# Patient Record
Sex: MALE | Race: WHITE | NOT HISPANIC OR LATINO | Employment: OTHER | URBAN - NONMETROPOLITAN AREA
[De-identification: names, ages, dates, MRNs, and addresses within clinical notes are randomized per-mention and may not be internally consistent; named-entity substitution may affect disease eponyms.]

---

## 2023-09-21 PROBLEM — I71.40 ABDOMINAL AORTIC ANEURYSM (AAA) (CMS-HCC): Status: ACTIVE | Noted: 2023-09-21

## 2023-09-21 PROBLEM — C45.9: Status: ACTIVE | Noted: 2023-09-21

## 2023-09-21 PROBLEM — C34.90 LUNG CANCER (MULTI): Status: ACTIVE | Noted: 2023-09-21

## 2023-09-21 PROBLEM — R63.6 UNDERWEIGHT: Status: ACTIVE | Noted: 2023-09-21

## 2023-09-21 PROBLEM — R91.8 LUNG MASS: Status: ACTIVE | Noted: 2023-09-21

## 2023-09-21 PROBLEM — I10 HYPERTENSION: Status: ACTIVE | Noted: 2023-09-21

## 2023-09-21 PROBLEM — I48.0 PAF (PAROXYSMAL ATRIAL FIBRILLATION) (MULTI): Status: ACTIVE | Noted: 2023-09-21

## 2023-09-21 PROBLEM — Z79.899 HIGH RISK MEDICATION USE: Status: ACTIVE | Noted: 2023-09-21

## 2023-09-21 PROBLEM — F17.200 CURRENT EVERY DAY SMOKER: Status: ACTIVE | Noted: 2023-09-21

## 2023-09-21 PROBLEM — K82.8 PORCELAIN GALLBLADDER: Status: ACTIVE | Noted: 2023-09-21

## 2023-09-21 RX ORDER — MIRTAZAPINE 15 MG/1
15 TABLET, FILM COATED ORAL NIGHTLY
COMMUNITY
End: 2024-06-10 | Stop reason: ALTCHOICE

## 2023-09-21 RX ORDER — GABAPENTIN 300 MG/1
1 CAPSULE ORAL DAILY
COMMUNITY

## 2023-09-21 RX ORDER — AMOXICILLIN 250 MG
2 CAPSULE ORAL NIGHTLY PRN
COMMUNITY
End: 2023-10-20

## 2023-09-21 RX ORDER — GUAIFENESIN 1200 MG/1
1200 TABLET, EXTENDED RELEASE ORAL EVERY 12 HOURS
COMMUNITY
Start: 2019-11-27 | End: 2023-10-20

## 2023-09-21 RX ORDER — ACETAMINOPHEN 500 MG
1 TABLET ORAL DAILY
COMMUNITY
End: 2023-10-20

## 2023-09-21 RX ORDER — ACETAMINOPHEN 500 MG/1
500 CAPSULE, LIQUID FILLED ORAL EVERY 6 HOURS PRN
COMMUNITY
End: 2023-10-20

## 2023-09-21 RX ORDER — MORPHINE SULFATE 15 MG/1
15 TABLET ORAL EVERY 12 HOURS
COMMUNITY
End: 2023-10-20

## 2023-09-21 RX ORDER — AMIODARONE HYDROCHLORIDE 200 MG/1
1 TABLET ORAL DAILY
COMMUNITY
End: 2023-10-20

## 2023-09-21 RX ORDER — POTASSIUM CHLORIDE 750 MG/1
2 CAPSULE, EXTENDED RELEASE ORAL DAILY
COMMUNITY
End: 2023-10-20

## 2023-09-21 RX ORDER — POLYETHYLENE GLYCOL 3350 17 G/17G
POWDER, FOR SOLUTION ORAL
COMMUNITY
End: 2023-10-20

## 2023-09-21 RX ORDER — POTASSIUM CHLORIDE 20 MEQ/1
1 TABLET, EXTENDED RELEASE ORAL DAILY
COMMUNITY
End: 2023-10-20

## 2023-09-21 RX ORDER — HYDROCHLOROTHIAZIDE 25 MG/1
1 TABLET ORAL DAILY
COMMUNITY
End: 2023-10-20

## 2023-09-21 RX ORDER — METOPROLOL TARTRATE 50 MG/1
0.5 TABLET ORAL 2 TIMES DAILY
COMMUNITY
End: 2024-06-10 | Stop reason: SDUPTHER

## 2023-09-21 RX ORDER — MORPHINE SULFATE 15 MG/1
1 TABLET, FILM COATED, EXTENDED RELEASE ORAL EVERY 8 HOURS
COMMUNITY
Start: 2019-11-27 | End: 2023-10-20

## 2023-09-21 RX ORDER — NAPROXEN 500 MG/1
500 TABLET ORAL 2 TIMES DAILY
COMMUNITY
End: 2023-10-20

## 2023-09-21 RX ORDER — DULOXETIN HYDROCHLORIDE 60 MG/1
1 CAPSULE, DELAYED RELEASE ORAL DAILY
COMMUNITY
End: 2024-06-10 | Stop reason: ALTCHOICE

## 2023-09-21 RX ORDER — LISINOPRIL 10 MG/1
1 TABLET ORAL DAILY
COMMUNITY
End: 2023-10-20

## 2023-09-21 RX ORDER — ONDANSETRON 4 MG/1
8 TABLET, FILM COATED ORAL EVERY 8 HOURS PRN
COMMUNITY
End: 2023-10-20

## 2023-09-21 RX ORDER — DOCUSATE SODIUM 100 MG/1
100 CAPSULE, LIQUID FILLED ORAL 3 TIMES DAILY
COMMUNITY
Start: 2019-11-27 | End: 2023-10-20

## 2023-09-21 RX ORDER — LIDOCAINE 50 MG/G
PATCH TOPICAL
COMMUNITY
End: 2023-10-20

## 2023-09-21 RX ORDER — AMLODIPINE BESYLATE 5 MG/1
1 TABLET ORAL DAILY
COMMUNITY
Start: 2022-03-03 | End: 2023-10-20

## 2023-09-21 RX ORDER — LISINOPRIL 20 MG/1
1 TABLET ORAL DAILY
COMMUNITY
End: 2024-06-10 | Stop reason: DRUGHIGH

## 2023-10-20 ENCOUNTER — OFFICE VISIT (OUTPATIENT)
Dept: CARDIOLOGY | Facility: CLINIC | Age: 73
End: 2023-10-20
Payer: MEDICARE

## 2023-10-20 VITALS
SYSTOLIC BLOOD PRESSURE: 132 MMHG | HEIGHT: 74 IN | DIASTOLIC BLOOD PRESSURE: 90 MMHG | HEART RATE: 62 BPM | WEIGHT: 136 LBS | BODY MASS INDEX: 17.45 KG/M2

## 2023-10-20 DIAGNOSIS — I10 PRIMARY HYPERTENSION: ICD-10-CM

## 2023-10-20 DIAGNOSIS — Z79.899 HIGH RISK MEDICATION USE: ICD-10-CM

## 2023-10-20 DIAGNOSIS — C34.12 MALIGNANT NEOPLASM OF UPPER LOBE OF LEFT LUNG (MULTI): ICD-10-CM

## 2023-10-20 DIAGNOSIS — I48.0 PAF (PAROXYSMAL ATRIAL FIBRILLATION) (MULTI): Primary | ICD-10-CM

## 2023-10-20 PROCEDURE — 4004F PT TOBACCO SCREEN RCVD TLK: CPT | Performed by: INTERNAL MEDICINE

## 2023-10-20 PROCEDURE — 99214 OFFICE O/P EST MOD 30 MIN: CPT | Performed by: INTERNAL MEDICINE

## 2023-10-20 PROCEDURE — 3080F DIAST BP >= 90 MM HG: CPT | Performed by: INTERNAL MEDICINE

## 2023-10-20 PROCEDURE — 3075F SYST BP GE 130 - 139MM HG: CPT | Performed by: INTERNAL MEDICINE

## 2023-10-20 PROCEDURE — 1159F MED LIST DOCD IN RCRD: CPT | Performed by: INTERNAL MEDICINE

## 2023-10-20 RX ORDER — MORPHINE SULFATE 15 MG/1
1 TABLET, FILM COATED, EXTENDED RELEASE ORAL 2 TIMES DAILY
COMMUNITY

## 2023-10-20 NOTE — PROGRESS NOTES
"Gina Sam is a 73 y.o. male       Chief Complaint    Follow-up          HPI     Patient returns in follow-up of problems as noted.  Sinus rhythm is maintained.  Medical therapy is significantly changed from before.  Most specifically the amiodarone was stopped.  His atrial fibrillation is none recurrent and because of this, for the time being, we will forego antiarrhythmic therapy as well as anticoagulant therapy.    He was educated regarding arrhythmia symptoms watch for and encouraged to call if they arise.    It is sounds like his lung cancer continues to be problematic.  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    In regards to his cardiac status his blood pressure is adequately controlled.  Review of Systems   All other systems reviewed and are negative.         Visit Vitals  /90 (BP Location: Right arm, Patient Position: Sitting)   Pulse 62   Ht 1.88 m (6' 2\")   Wt 61.7 kg (136 lb)   BMI 17.46 kg/m²   Smoking Status Every Day   BSA 1.8 m²       Objective   Physical Exam  Constitutional:       Appearance: Normal appearance. He is normal weight.   HENT:      Nose: Nose normal.   Neck:      Vascular: No carotid bruit.   Cardiovascular:      Rate and Rhythm: Normal rate.      Pulses: Normal pulses.      Heart sounds: Normal heart sounds.   Pulmonary:      Effort: Pulmonary effort is normal.   Abdominal:      General: Bowel sounds are normal.      Palpations: Abdomen is soft.   Genitourinary:     Rectum: Normal.   Musculoskeletal:         General: Normal range of motion.      Cervical back: Normal range of motion.      Right lower leg: No edema.      Left lower leg: No edema.   Skin:     General: Skin is warm and dry.   Neurological:      General: No focal deficit present.      Mental Status: He is alert.   Psychiatric:         Mood and Affect: Mood normal.         Behavior: Behavior normal.         Thought Content: Thought content normal.         Judgment: Judgment normal.         Current " Medications    Current Outpatient Medications:     docusate sodium (Colace) 50 mg capsule, Take 1 capsule (50 mg) by mouth once daily., Disp: , Rfl:     DULoxetine (Cymbalta) 60 mg DR capsule, Take 1 capsule (60 mg) by mouth once daily., Disp: , Rfl:     gabapentin (Neurontin) 300 mg capsule, Take 1 capsule (300 mg) by mouth once daily., Disp: , Rfl:     lisinopril 20 mg tablet, Take 1 tablet (20 mg) by mouth once daily., Disp: , Rfl:     metoprolol tartrate (Lopressor) 50 mg tablet, Take 0.5 tablets by mouth 2 times a day., Disp: , Rfl:     mirtazapine (Remeron) 15 mg tablet, Take 1 tablet (15 mg) by mouth once daily at bedtime., Disp: , Rfl:     morphine CR (MS Contin) 15 mg 12 hr tablet, Take 1 tablet (15 mg) by mouth 2 times a day. Do not crush, chew, or split., Disp: , Rfl:                      Assessment/Plan   There are no diagnoses linked to this encounter. 1. PAF (paroxysmal atrial fibrillation) (CMS/HCC)  None recurrent at the moment.  Stay off amiodarone because of recently abnormal PFTs.  They may have been abnormal because of lung resection.  - Follow Up In Cardiology; Future    2. High risk medication use  Amiodarone discontinued  - Follow Up In Cardiology; Future    3. Primary hypertension  Adequately controlled    4. Malignant neoplasm of upper lobe of left lung (CMS/HCC)  This is his primary medical problem.  I believe it will ultimately outweigh all the other cardiovascular problems that we are managing

## 2024-05-29 ENCOUNTER — APPOINTMENT (OUTPATIENT)
Dept: CARDIOLOGY | Facility: CLINIC | Age: 74
End: 2024-05-29
Payer: MEDICARE

## 2024-06-10 ENCOUNTER — OFFICE VISIT (OUTPATIENT)
Dept: CARDIOLOGY | Facility: CLINIC | Age: 74
End: 2024-06-10
Payer: MEDICARE

## 2024-06-10 VITALS
WEIGHT: 130.4 LBS | DIASTOLIC BLOOD PRESSURE: 84 MMHG | BODY MASS INDEX: 16.74 KG/M2 | HEIGHT: 74 IN | HEART RATE: 80 BPM | SYSTOLIC BLOOD PRESSURE: 120 MMHG

## 2024-06-10 DIAGNOSIS — I10 PRIMARY HYPERTENSION: Primary | ICD-10-CM

## 2024-06-10 DIAGNOSIS — F17.200 CURRENT EVERY DAY SMOKER: ICD-10-CM

## 2024-06-10 DIAGNOSIS — Z79.899 HIGH RISK MEDICATION USE: ICD-10-CM

## 2024-06-10 DIAGNOSIS — I48.0 PAF (PAROXYSMAL ATRIAL FIBRILLATION) (MULTI): ICD-10-CM

## 2024-06-10 PROCEDURE — 3008F BODY MASS INDEX DOCD: CPT | Performed by: INTERNAL MEDICINE

## 2024-06-10 PROCEDURE — 3074F SYST BP LT 130 MM HG: CPT | Performed by: INTERNAL MEDICINE

## 2024-06-10 PROCEDURE — 1159F MED LIST DOCD IN RCRD: CPT | Performed by: INTERNAL MEDICINE

## 2024-06-10 PROCEDURE — 3079F DIAST BP 80-89 MM HG: CPT | Performed by: INTERNAL MEDICINE

## 2024-06-10 PROCEDURE — 99214 OFFICE O/P EST MOD 30 MIN: CPT | Performed by: INTERNAL MEDICINE

## 2024-06-10 RX ORDER — LISINOPRIL 20 MG/1
20 TABLET ORAL DAILY
Qty: 90 TABLET | Refills: 3 | Status: CANCELLED | OUTPATIENT
Start: 2024-06-10 | End: 2025-06-10

## 2024-06-10 RX ORDER — METOPROLOL TARTRATE 50 MG/1
25 TABLET ORAL 2 TIMES DAILY
Qty: 90 TABLET | Refills: 3 | Status: SHIPPED | OUTPATIENT
Start: 2024-06-10 | End: 2025-06-10

## 2024-06-10 RX ORDER — LISINOPRIL 10 MG/1
10 TABLET ORAL DAILY
Qty: 90 TABLET | Refills: 3 | Status: SHIPPED | OUTPATIENT
Start: 2024-06-10 | End: 2025-06-10

## 2024-06-10 NOTE — LETTER
"Hceyenne 10, 2024     Kamar Martinez,   [address]  [address]  [place] [address]    Patient: Cosmo Sam   YOB: 1950   Date of Visit: 6/10/2024       Dear Dr. Kamar Martinez, DO:    Thank you for referring Cosmo Sam to me for evaluation. Below are my notes for this consultation.  If you have questions, please do not hesitate to call me. I look forward to following your patient along with you.       Sincerely,     Guillaume Moctezuma MD      CC: No Recipients  ______________________________________________________________________________________    Subjective   Cosmo Sam is a 74 y.o. male       Chief Complaint    Follow-up          HPI     Review of Systems   All other systems reviewed and are negative.     Patient returns in follow-up of problems as noted.  In the interim he has done well.  He has had no symptomatic recurrence of his atrial fibrillation.  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  I educated him on how to check his rhythm daily with blood pressure cuff and he will do so and let us know if there is any irregularity of rhythm.  Otherwise I believe we should continue as is in regards to his one-time episode of paroxysmal atrial fibrillation (no antiarrhythmics and no anticoagulants).  His care is complicated by 1 or 2 primary malignancies.    In regards to his blood pressure we know it is a bit low.  He is orthostatic from time to time.  Because of this I recommend a reduction in lisinopril dosage because of his significant weight loss.  An unrestricted caloric diet was also recommended.      Vitals:    06/10/24 0938   BP: 120/84   BP Location: Left arm   Patient Position: Sitting   Pulse: 80   Weight: 59.1 kg (130 lb 6.4 oz)   Height: 1.88 m (6' 2\")        Objective   Physical Exam  Constitutional:       Appearance: Normal appearance.   HENT:      Nose: Nose normal.   Neck:      Vascular: No carotid bruit. "   Cardiovascular:      Rate and Rhythm: Normal rate.      Pulses: Normal pulses.      Heart sounds: Normal heart sounds.   Pulmonary:      Effort: Pulmonary effort is normal.   Abdominal:      General: Bowel sounds are normal.      Palpations: Abdomen is soft.   Musculoskeletal:         General: Normal range of motion.      Cervical back: Normal range of motion.      Right lower leg: No edema.      Left lower leg: No edema.   Skin:     General: Skin is warm and dry.   Neurological:      General: No focal deficit present.      Mental Status: He is alert.   Psychiatric:         Mood and Affect: Mood normal.         Behavior: Behavior normal.         Thought Content: Thought content normal.         Judgment: Judgment normal.         Allergies  Hydrocodone and Hydrocodone-acetaminophen     Current Medications    Current Outpatient Medications:   •  gabapentin (Neurontin) 300 mg capsule, Take 1 capsule (300 mg) by mouth once daily., Disp: , Rfl:   •  lisinopril 20 mg tablet, Take 1 tablet (20 mg) by mouth once daily., Disp: , Rfl:   •  metoprolol tartrate (Lopressor) 50 mg tablet, Take 0.5 tablets by mouth 2 times a day., Disp: , Rfl:   •  morphine CR (MS Contin) 15 mg 12 hr tablet, Take 1 tablet (15 mg) by mouth 2 times a day. Do not crush, chew, or split., Disp: , Rfl:   •  vitamin D3-vitamin K2 1,250-200 mcg capsule, Take by mouth., Disp: , Rfl:                      Assessment/Plan   1. PAF (paroxysmal atrial fibrillation) (Multi)  No recurrence based upon detailed history taking.  He has the ability to understand in a regular rhythm (being in position) and as a consequence we suggest daily screening with blood pressure cuff for rhythm irregularities  - Follow Up In Cardiology    2. High risk medication use  No longer on amiodarone.  I do not believe this should be resumed.  - Follow Up In Cardiology    3. Primary hypertension  Blood pressure is a bit low normal.  I recommend a reduction in lisinopril dosage because  of orthostatic symptoms    4. BMI less than 19,adult  Significant weight loss presumably on the basis of malignancy    5. Current every day smoker  The merits of cessation were discussed        Scribe Attestation  By signing my name below, I, Jayce Heart LPN   attest that this documentation has been prepared under the direction and in the presence of Guillaume Moctezuma MD.     Provider Attestation - Scribe documentation    All medical record entries made by the Scribe were at my direction and personally dictated by me. I have reviewed the chart and agree that the record accurately reflects my personal performance of the history, physical exam, discussion and plan.

## 2024-06-10 NOTE — PROGRESS NOTES
"Gina Sam is a 74 y.o. male       Chief Complaint    Follow-up          HPI     Review of Systems   All other systems reviewed and are negative.     Patient returns in follow-up of problems as noted.  In the interim he has done well.  He has had no symptomatic recurrence of his atrial fibrillation.  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  I educated him on how to check his rhythm daily with blood pressure cuff and he will do so and let us know if there is any irregularity of rhythm.  Otherwise I believe we should continue as is in regards to his one-time episode of paroxysmal atrial fibrillation (no antiarrhythmics and no anticoagulants).  His care is complicated by 1 or 2 primary malignancies.    In regards to his blood pressure we know it is a bit low.  He is orthostatic from time to time.  Because of this I recommend a reduction in lisinopril dosage because of his significant weight loss.  An unrestricted caloric diet was also recommended.      Vitals:    06/10/24 0938   BP: 120/84   BP Location: Left arm   Patient Position: Sitting   Pulse: 80   Weight: 59.1 kg (130 lb 6.4 oz)   Height: 1.88 m (6' 2\")        Objective   Physical Exam  Constitutional:       Appearance: Normal appearance.   HENT:      Nose: Nose normal.   Neck:      Vascular: No carotid bruit.   Cardiovascular:      Rate and Rhythm: Normal rate.      Pulses: Normal pulses.      Heart sounds: Normal heart sounds.   Pulmonary:      Effort: Pulmonary effort is normal.   Abdominal:      General: Bowel sounds are normal.      Palpations: Abdomen is soft.   Musculoskeletal:         General: Normal range of motion.      Cervical back: Normal range of motion.      Right lower leg: No edema.      Left lower leg: No edema.   Skin:     General: Skin is warm and dry.   Neurological:      General: No focal deficit present.      Mental Status: He is alert.   Psychiatric:         Mood and Affect: Mood " normal.         Behavior: Behavior normal.         Thought Content: Thought content normal.         Judgment: Judgment normal.         Allergies  Hydrocodone and Hydrocodone-acetaminophen     Current Medications    Current Outpatient Medications:     gabapentin (Neurontin) 300 mg capsule, Take 1 capsule (300 mg) by mouth once daily., Disp: , Rfl:     lisinopril 20 mg tablet, Take 1 tablet (20 mg) by mouth once daily., Disp: , Rfl:     metoprolol tartrate (Lopressor) 50 mg tablet, Take 0.5 tablets by mouth 2 times a day., Disp: , Rfl:     morphine CR (MS Contin) 15 mg 12 hr tablet, Take 1 tablet (15 mg) by mouth 2 times a day. Do not crush, chew, or split., Disp: , Rfl:     vitamin D3-vitamin K2 1,250-200 mcg capsule, Take by mouth., Disp: , Rfl:                      Assessment/Plan   1. PAF (paroxysmal atrial fibrillation) (Multi)  No recurrence based upon detailed history taking.  He has the ability to understand in a regular rhythm (being in position) and as a consequence we suggest daily screening with blood pressure cuff for rhythm irregularities  - Follow Up In Cardiology    2. High risk medication use  No longer on amiodarone.  I do not believe this should be resumed.  - Follow Up In Cardiology    3. Primary hypertension  Blood pressure is a bit low normal.  I recommend a reduction in lisinopril dosage because of orthostatic symptoms    4. BMI less than 19,adult  Significant weight loss presumably on the basis of malignancy    5. Current every day smoker  The merits of cessation were discussed        Scribe Attestation  By signing my name below, IUna LPN, Scribe   attest that this documentation has been prepared under the direction and in the presence of Guillaume Moctezuma MD.     Provider Attestation - Scribe documentation    All medical record entries made by the Scribe were at my direction and personally dictated by me. I have reviewed the chart and agree that the record accurately reflects my  personal performance of the history, physical exam, discussion and plan.

## 2024-06-21 ENCOUNTER — TELEPHONE (OUTPATIENT)
Dept: CARDIOLOGY | Facility: CLINIC | Age: 74
End: 2024-06-21
Payer: MEDICARE

## 2024-06-21 RX ORDER — DULOXETIN HYDROCHLORIDE 60 MG/1
60 CAPSULE, DELAYED RELEASE ORAL DAILY
COMMUNITY

## 2024-06-21 RX ORDER — MIRTAZAPINE 15 MG/1
15 TABLET, FILM COATED ORAL NIGHTLY
COMMUNITY

## 2024-06-21 RX ORDER — LISINOPRIL 20 MG/1
20 TABLET ORAL 2 TIMES DAILY
COMMUNITY

## 2024-12-10 ENCOUNTER — APPOINTMENT (OUTPATIENT)
Dept: CARDIOLOGY | Facility: CLINIC | Age: 74
End: 2024-12-10
Payer: MEDICARE

## 2024-12-10 VITALS
WEIGHT: 133 LBS | SYSTOLIC BLOOD PRESSURE: 138 MMHG | BODY MASS INDEX: 17.07 KG/M2 | DIASTOLIC BLOOD PRESSURE: 86 MMHG | HEART RATE: 52 BPM | HEIGHT: 74 IN

## 2024-12-10 DIAGNOSIS — F17.200 CURRENT EVERY DAY SMOKER: ICD-10-CM

## 2024-12-10 DIAGNOSIS — I71.40 ABDOMINAL AORTIC ANEURYSM (AAA) WITHOUT RUPTURE, UNSPECIFIED PART (CMS-HCC): ICD-10-CM

## 2024-12-10 DIAGNOSIS — Z79.899 HIGH RISK MEDICATION USE: ICD-10-CM

## 2024-12-10 DIAGNOSIS — I10 PRIMARY HYPERTENSION: ICD-10-CM

## 2024-12-10 DIAGNOSIS — I48.0 PAF (PAROXYSMAL ATRIAL FIBRILLATION) (MULTI): Primary | ICD-10-CM

## 2024-12-10 PROCEDURE — 3008F BODY MASS INDEX DOCD: CPT | Performed by: NURSE PRACTITIONER

## 2024-12-10 PROCEDURE — 1160F RVW MEDS BY RX/DR IN RCRD: CPT | Performed by: NURSE PRACTITIONER

## 2024-12-10 PROCEDURE — 99213 OFFICE O/P EST LOW 20 MIN: CPT | Performed by: NURSE PRACTITIONER

## 2024-12-10 PROCEDURE — 3079F DIAST BP 80-89 MM HG: CPT | Performed by: NURSE PRACTITIONER

## 2024-12-10 PROCEDURE — 3075F SYST BP GE 130 - 139MM HG: CPT | Performed by: NURSE PRACTITIONER

## 2024-12-10 PROCEDURE — 1159F MED LIST DOCD IN RCRD: CPT | Performed by: NURSE PRACTITIONER

## 2024-12-10 RX ORDER — IBUPROFEN 200 MG
1 TABLET ORAL EVERY 24 HOURS
Qty: 30 PATCH | Refills: 0 | Status: SHIPPED | OUTPATIENT
Start: 2024-12-10 | End: 2025-01-09

## 2024-12-10 ASSESSMENT — ENCOUNTER SYMPTOMS
ORTHOPNEA: 0
IRREGULAR HEARTBEAT: 0
SYNCOPE: 0
NEAR-SYNCOPE: 0
DYSPNEA ON EXERTION: 0
PALPITATIONS: 0
PND: 0

## 2024-12-10 NOTE — ASSESSMENT & PLAN NOTE
1/2 pack per day    In past patches, chantix not successful    Continued every day tobacco use. Have reviewed the negative cardiovascular impact of nicotine. Continues to decline pharmacological assistance.     Agrees to try patches again today

## 2024-12-10 NOTE — LETTER
"December 10, 2024     Kamar Martinez DO  [address]  [place] [address]    Patient: Cosmo Sam   YOB: 1950   Date of Visit: 12/10/2024       Dear Dr. Kamar Martinez, DO:    Thank you for referring Cosmo Sam to me for evaluation. Below are my notes for this consultation.  If you have questions, please do not hesitate to call me. I look forward to following your patient along with you.       Sincerely,     Ella Urena, APRN-CNP      CC: No Recipients  ______________________________________________________________________________________    Chief Complaint  \"Doing  just fine\"     Reason for Visit  Annual follow-up  Patient presents to the office today for outpatient follow-up for paroxysmal atrial fibrillation.  Last evaluated in clinic by Dr. Decker June 2024.    Presents today ambulatory with steady gait.  Accompanied by patient  Patient denies any hospitalizations or significant changes to interval medical history since last office follow-up.   He follows routinely with PCP and oncology.    History of Present Illness   Patient is a very pleasant 74-year-old gentleman who presents to the office today with no voiced cardiovascular complaints.  He was originally evaluated by Dr. Mccord November 2021 as inpatient cardiology consult due to atrial fibrillation identified at time of neck dissection.  He spontaneously converted and was placed on amiodarone.  He had been maintained on amiodarone until May 2023 at which time it was discontinued due to nonadherence to surveillance testing.  He was never placed on anticoagulation.  He has been educated on how to monitor his pulse for abnormalities.  Continues to maintain regular ausculatory rate and rhythm on Lopressor.    From an activity standpoint he is able to do yard work and clean his house, he works teaching music.  He ambulated in from the parking lot without concerns.    No prior CAD workup: No " "symptoms that would warrant testing.  He does have prior EVAR -presents today off his statin and he is unclear as to why.    Patient reports that overall has no complaint(s) of chest pain, chest pressure/discomfort, claudication, dyspnea, exertional chest pressure/discomfort, and fatigue    Daily activity:    Greater than 4 METS.  Denies any change in exercise capacity or functional tolerance since last office visit.    The importance of primary prevention reviewed:  HTN: Optimal in office  HLD: Unknown lipid status  DM: Denies  Smoker: Is in agreement to trial NicoDerm patches once again  BMI:  Reviewed the merits of healthy lifestyle choices on overall cardiovascular health.    Review of Systems   Cardiovascular:  Negative for chest pain, dyspnea on exertion, irregular heartbeat, leg swelling, near-syncope, orthopnea, palpitations, paroxysmal nocturnal dyspnea and syncope.        Visit Vitals  /86 (BP Location: Left arm, Patient Position: Sitting)   Pulse 52   Ht 1.88 m (6' 2\")   Wt 60.3 kg (133 lb)   BMI 17.08 kg/m²   Smoking Status Every Day   BSA 1.77 m²     Physical Exam  Vitals and nursing note reviewed.   Constitutional:       Appearance: Normal appearance. He is underweight.   Cardiovascular:      Rate and Rhythm: Normal rate and regular rhythm.      Heart sounds: Normal heart sounds.   Pulmonary:      Effort: Pulmonary effort is normal.      Breath sounds: Normal breath sounds.   Musculoskeletal:      Cervical back: Full passive range of motion without pain.      Right lower leg: No edema.      Left lower leg: No edema.   Skin:     General: Skin is cool.   Neurological:      Mental Status: He is alert and oriented to person, place, and time.   Psychiatric:         Attention and Perception: Attention normal.         Mood and Affect: Mood normal.         Behavior: Behavior is cooperative.        Allergies   Allergen Reactions   • Hydrocodone Nausea/vomiting     nausea   • Hydrocodone-Acetaminophen " "Nausea Only and Nausea/vomiting       Current Outpatient Medications   Medication Instructions   • DULoxetine (CYMBALTA) 60 mg, Daily   • lisinopril 10 mg, oral, Daily   • metoprolol tartrate (LOPRESSOR) 25 mg, oral, 2 times daily   • mirtazapine (REMERON) 15 mg, Nightly   • morphine CR (MS Contin) 15 mg 12 hr tablet 1 tablet, Daily   • nicotine (Nicoderm CQ) 14 mg/24 hr patch 1 patch, transdermal, Every 24 hours   • vitamin D3-vitamin K2 1,250-200 mcg capsule Take by mouth.      Assessment:    Current every day smoker  1/2 pack per day    In past patches, chantix not successful    Continued every day tobacco use. Have reviewed the negative cardiovascular impact of nicotine. Continues to decline pharmacological assistance.     Agrees to try patches again today     High risk medication use  He was maintained on amiodarone from November 2021 through May 2023.  Medication was discontinued due to nonadherence to surveillance testing.    Abdominal aortic aneurysm (AAA) (CMS-[facility])  October 2019 [employer]ant abdominal stent graft    Hypertension  Optimal in office    PAF (paroxysmal atrial fibrillation) (Multi)  Initial diagnosis November 2021 at time of neck dissection.  He was placed on amiodarone (stopped May 2023).  He denies any type of palpitations, heart rate at home is\" usually low\" on current dose of Lopressor.    CHADS VASc 2 with decision not to anticoagulate due to surgical procedure, high bleeding risk, treatment strategy to mitigate atrial fibrillation with amiodarone.    Plan:     Through informed decision making process incorporating patients unique circumstances, the following treatment plan will be initiated:    1.  Prescription drug management of cardiovascular medication for efficacy, adherence to treatment, side effect assessment and polypharmacy. Current treatment clinically warranted and to continue without modifications.    2.  Nicoderm patches    3. Return for follow-up; in the interim, contact the " office if new symptoms arise.  Dr. Christianson 6 months     You need to stop smoking.  Though it is not easy, more than half of all adults smokers have quit.  We encourage you to write down all the reasons you should quit smoking and set a quit date for yourself.  Ask us how we can help.  You may also call 8-196-QUIT-NOW for free resources and assistance.     Ella Urena  MSN, APRN-CNP, PMHNP-BC  [facility]    Please excuse any errors in grammar or translation related to this dictation. Voice recognition software was utilized to prepare this document.

## 2024-12-10 NOTE — ASSESSMENT & PLAN NOTE
"Initial diagnosis November 2021 at time of neck dissection.  He was placed on amiodarone (stopped May 2023).  He denies any type of palpitations, heart rate at home is\" usually low\" on current dose of Lopressor.    CHADS VASc 2 with decision not to anticoagulate due to surgical procedure, high bleeding risk, treatment strategy to mitigate atrial fibrillation with amiodarone.  "

## 2024-12-10 NOTE — PATIENT INSTRUCTIONS
Please bring all medicines, vitamins, and herbal supplements with you when you come to the office.    Prescriptions will not be filled unless you are compliant with your follow up appointments or have a follow up appointment scheduled as per instruction of your physician. Refills should be requested at the time of your visit.     PLAN:   Through informed decision making process incorporating patients unique circumstances, the following treatment plan will be initiated:    1.  Prescription drug management of cardiovascular medication for efficacy, adherence to treatment, side effect assessment and polypharmacy. Current treatment clinically warranted and to continue without modifications.    2.  Nicoderm patches    3. Return for follow-up; in the interim, contact the office if new symptoms arise.  Dr. Christianson 6 months     You need to stop smoking.  Though it is not easy, more than half of all adults smokers have quit.  We encourage you to write down all the reasons you should quit smoking and set a quit date for yourself.  Ask us how we can help.  You may also call 9-091-QUIT-NOW for free resources and assistance.

## 2024-12-10 NOTE — ASSESSMENT & PLAN NOTE
He was maintained on amiodarone from November 2021 through May 2023.  Medication was discontinued due to nonadherence to surveillance testing.

## 2024-12-10 NOTE — PROGRESS NOTES
"Chief Complaint  \"Doing  just fine\"     Reason for Visit  Annual follow-up  Patient presents to the office today for outpatient follow-up for paroxysmal atrial fibrillation.  Last evaluated in clinic by Dr. Decker June 2024.    Presents today ambulatory with steady gait.  Accompanied by patient  Patient denies any hospitalizations or significant changes to interval medical history since last office follow-up.   He follows routinely with PCP and oncology.    History of Present Illness   Patient is a very pleasant 74-year-old gentleman who presents to the office today with no voiced cardiovascular complaints.  He was originally evaluated by Dr. Mccord November 2021 as inpatient cardiology consult due to atrial fibrillation identified at time of neck dissection.  He spontaneously converted and was placed on amiodarone.  He had been maintained on amiodarone until May 2023 at which time it was discontinued due to nonadherence to surveillance testing.  He was never placed on anticoagulation.  He has been educated on how to monitor his pulse for abnormalities.  Continues to maintain regular ausculatory rate and rhythm on Lopressor.    From an activity standpoint he is able to do yard work and clean his house, he works teaching music.  He ambulated in from the parking lot without concerns.    No prior CAD workup: No symptoms that would warrant testing.  He does have prior EVAR -presents today off his statin and he is unclear as to why.    Patient reports that overall has no complaint(s) of chest pain, chest pressure/discomfort, claudication, dyspnea, exertional chest pressure/discomfort, and fatigue    Daily activity:    Greater than 4 METS.  Denies any change in exercise capacity or functional tolerance since last office visit.    The importance of primary prevention reviewed:  HTN: Optimal in office  HLD: Unknown lipid status  DM: Denies  Smoker: Is in agreement to trial NicoDerm patches once again  BMI:  Reviewed the " "merits of healthy lifestyle choices on overall cardiovascular health.    Review of Systems   Cardiovascular:  Negative for chest pain, dyspnea on exertion, irregular heartbeat, leg swelling, near-syncope, orthopnea, palpitations, paroxysmal nocturnal dyspnea and syncope.        Visit Vitals  /86 (BP Location: Left arm, Patient Position: Sitting)   Pulse 52   Ht 1.88 m (6' 2\")   Wt 60.3 kg (133 lb)   BMI 17.08 kg/m²   Smoking Status Every Day   BSA 1.77 m²     Physical Exam  Vitals and nursing note reviewed.   Constitutional:       Appearance: Normal appearance. He is underweight.   Cardiovascular:      Rate and Rhythm: Normal rate and regular rhythm.      Heart sounds: Normal heart sounds.   Pulmonary:      Effort: Pulmonary effort is normal.      Breath sounds: Normal breath sounds.   Musculoskeletal:      Cervical back: Full passive range of motion without pain.      Right lower leg: No edema.      Left lower leg: No edema.   Skin:     General: Skin is cool.   Neurological:      Mental Status: He is alert and oriented to person, place, and time.   Psychiatric:         Attention and Perception: Attention normal.         Mood and Affect: Mood normal.         Behavior: Behavior is cooperative.        Allergies   Allergen Reactions    Hydrocodone Nausea/vomiting     nausea    Hydrocodone-Acetaminophen Nausea Only and Nausea/vomiting       Current Outpatient Medications   Medication Instructions    DULoxetine (CYMBALTA) 60 mg, Daily    lisinopril 10 mg, oral, Daily    metoprolol tartrate (LOPRESSOR) 25 mg, oral, 2 times daily    mirtazapine (REMERON) 15 mg, Nightly    morphine CR (MS Contin) 15 mg 12 hr tablet 1 tablet, Daily    nicotine (Nicoderm CQ) 14 mg/24 hr patch 1 patch, transdermal, Every 24 hours    vitamin D3-vitamin K2 1,250-200 mcg capsule Take by mouth.      Assessment:    Current every day smoker  1/2 pack per day    In past patches, chantix not successful    Continued every day tobacco use. Have " "reviewed the negative cardiovascular impact of nicotine. Continues to decline pharmacological assistance.     Agrees to try patches again today     High risk medication use  He was maintained on amiodarone from November 2021 through May 2023.  Medication was discontinued due to nonadherence to surveillance testing.    Abdominal aortic aneurysm (AAA) (CMS-HCC)  October 2019 Endurant abdominal stent graft    Hypertension  Optimal in office    PAF (paroxysmal atrial fibrillation) (Multi)  Initial diagnosis November 2021 at time of neck dissection.  He was placed on amiodarone (stopped May 2023).  He denies any type of palpitations, heart rate at home is\" usually low\" on current dose of Lopressor.    CHADS VASc 2 with decision not to anticoagulate due to surgical procedure, high bleeding risk, treatment strategy to mitigate atrial fibrillation with amiodarone.    Plan:     Through informed decision making process incorporating patients unique circumstances, the following treatment plan will be initiated:    1.  Prescription drug management of cardiovascular medication for efficacy, adherence to treatment, side effect assessment and polypharmacy. Current treatment clinically warranted and to continue without modifications.    2.  Nicoderm patches    3. Return for follow-up; in the interim, contact the office if new symptoms arise.  Dr. Christianson 6 months     You need to stop smoking.  Though it is not easy, more than half of all adults smokers have quit.  We encourage you to write down all the reasons you should quit smoking and set a quit date for yourself.  Ask us how we can help.  You may also call 9-120-QUIT-NOW for free resources and assistance.     Ella Urena  MSN, APRN-CNP, PMHNP-BC  [facility]    Please excuse any errors in grammar or translation related to this dictation. Voice recognition software was utilized to prepare this document.    "

## 2025-06-13 ENCOUNTER — APPOINTMENT (OUTPATIENT)
Dept: CARDIOLOGY | Facility: CLINIC | Age: 75
End: 2025-06-13
Payer: MEDICARE